# Patient Record
Sex: FEMALE | Race: WHITE
[De-identification: names, ages, dates, MRNs, and addresses within clinical notes are randomized per-mention and may not be internally consistent; named-entity substitution may affect disease eponyms.]

---
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
## 2022-12-20 ENCOUNTER — HOSPITAL ENCOUNTER (EMERGENCY)
Dept: HOSPITAL 26 - MED | Age: 22
LOS: 1 days | Discharge: HOME | End: 2022-12-21
Payer: COMMERCIAL

## 2022-12-20 VITALS — HEIGHT: 69 IN | BODY MASS INDEX: 41.18 KG/M2 | WEIGHT: 278 LBS

## 2022-12-20 VITALS — DIASTOLIC BLOOD PRESSURE: 77 MMHG | SYSTOLIC BLOOD PRESSURE: 134 MMHG

## 2022-12-20 DIAGNOSIS — Z20.822: ICD-10-CM

## 2022-12-20 DIAGNOSIS — J06.9: Primary | ICD-10-CM

## 2022-12-20 DIAGNOSIS — J45.909: ICD-10-CM

## 2022-12-20 DIAGNOSIS — Z79.899: ICD-10-CM

## 2022-12-21 VITALS — DIASTOLIC BLOOD PRESSURE: 77 MMHG | SYSTOLIC BLOOD PRESSURE: 134 MMHG

## 2022-12-21 NOTE — NUR
Patient discharged with v/s stable. Written and verbal after care instructions 
given and explained. 

Patient alert, oriented and verbalized understanding of instructions. 
Ambulatory with steady gait. All questions addressed prior to discharge. ID 
band removed. Patient advised to follow up with PMD. Rx of TYLENOL EXTRA 
STRENGTH, NAPROXEN given. Patient educated on indication of medication 
including possible reaction and side effects. Opportunity to ask questions 
provided and answered.



DX: UPPER RESPIRATORY INFECTION, ADULT, PHARYNGITIS

## 2022-12-21 NOTE — NUR
PATIENT WITH COMPLAINTS OF SORE THROAT X2 DAYS AND FEVER.  REPORTS COVID 
INFECTION APPROXIMATELY 3 WEEKS AGO